# Patient Record
Sex: FEMALE | Race: BLACK OR AFRICAN AMERICAN | NOT HISPANIC OR LATINO | Employment: FULL TIME | URBAN - METROPOLITAN AREA
[De-identification: names, ages, dates, MRNs, and addresses within clinical notes are randomized per-mention and may not be internally consistent; named-entity substitution may affect disease eponyms.]

---

## 2017-01-31 ENCOUNTER — HOSPITAL ENCOUNTER (OUTPATIENT)
Dept: LAB | Facility: MEDICAL CENTER | Age: 32
End: 2017-01-31
Attending: OBSTETRICS & GYNECOLOGY
Payer: COMMERCIAL

## 2017-01-31 LAB
B-HCG SERPL-ACNC: 3.3 MIU/ML (ref 0–10)
BASOPHILS # BLD AUTO: 0.3 % (ref 0–1.8)
BASOPHILS # BLD: 0.02 K/UL (ref 0–0.12)
DHEA-S SERPL-MCNC: 70.2 UG/DL (ref 98.8–340)
EOSINOPHIL # BLD AUTO: 0.13 K/UL (ref 0–0.51)
EOSINOPHIL NFR BLD: 2.2 % (ref 0–6.9)
ERYTHROCYTE [DISTWIDTH] IN BLOOD BY AUTOMATED COUNT: 42.2 FL (ref 35.9–50)
GLUCOSE SERPL-MCNC: 89 MG/DL (ref 65–99)
HCT VFR BLD AUTO: 38.3 % (ref 37–47)
HGB BLD-MCNC: 13.2 G/DL (ref 12–16)
IMM GRANULOCYTES # BLD AUTO: 0.01 K/UL (ref 0–0.11)
IMM GRANULOCYTES NFR BLD AUTO: 0.2 % (ref 0–0.9)
LYMPHOCYTES # BLD AUTO: 1.94 K/UL (ref 1–4.8)
LYMPHOCYTES NFR BLD: 32.2 % (ref 22–41)
MCH RBC QN AUTO: 30.5 PG (ref 27–33)
MCHC RBC AUTO-ENTMCNC: 34.5 G/DL (ref 33.6–35)
MCV RBC AUTO: 88.5 FL (ref 81.4–97.8)
MONOCYTES # BLD AUTO: 0.36 K/UL (ref 0–0.85)
MONOCYTES NFR BLD AUTO: 6 % (ref 0–13.4)
NEUTROPHILS # BLD AUTO: 3.57 K/UL (ref 2–7.15)
NEUTROPHILS NFR BLD: 59.1 % (ref 44–72)
NRBC # BLD AUTO: 0 K/UL
NRBC BLD AUTO-RTO: 0 /100 WBC
PLATELET # BLD AUTO: 254 K/UL (ref 164–446)
PMV BLD AUTO: 9.1 FL (ref 9–12.9)
PROLACTIN SERPL-MCNC: 9.42 NG/ML (ref 2.8–26)
RBC # BLD AUTO: 4.33 M/UL (ref 4.2–5.4)
TSH SERPL DL<=0.005 MIU/L-ACNC: 1.23 UIU/ML (ref 0.35–5.5)
WBC # BLD AUTO: 6 K/UL (ref 4.8–10.8)

## 2017-01-31 PROCEDURE — 85025 COMPLETE CBC W/AUTO DIFF WBC: CPT

## 2017-01-31 PROCEDURE — 83525 ASSAY OF INSULIN: CPT

## 2017-01-31 PROCEDURE — 84146 ASSAY OF PROLACTIN: CPT

## 2017-01-31 PROCEDURE — 36415 COLL VENOUS BLD VENIPUNCTURE: CPT

## 2017-01-31 PROCEDURE — 84443 ASSAY THYROID STIM HORMONE: CPT

## 2017-01-31 PROCEDURE — 84702 CHORIONIC GONADOTROPIN TEST: CPT

## 2017-01-31 PROCEDURE — 82947 ASSAY GLUCOSE BLOOD QUANT: CPT

## 2017-01-31 PROCEDURE — 82627 DEHYDROEPIANDROSTERONE: CPT

## 2017-02-02 LAB — INSULIN P FAST SERPL-ACNC: 12 UIU/ML (ref 3–19)

## 2017-08-18 ENCOUNTER — HOSPITAL ENCOUNTER (OUTPATIENT)
Dept: LAB | Facility: MEDICAL CENTER | Age: 32
End: 2017-08-18
Payer: COMMERCIAL

## 2017-08-18 LAB
BDY FAT % MEASURED: 27.6 %
BP DIAS: 70 MMHG
BP SYS: 109 MMHG
CHOLEST SERPL-MCNC: 137 MG/DL (ref 100–199)
DIABETES HTDIA: NO
EVENT NAME HTEVT: NORMAL
FASTING HTFAS: YES
GLUCOSE SERPL-MCNC: 82 MG/DL (ref 65–99)
HDLC SERPL-MCNC: 51 MG/DL
HYPERTENSION HTHYP: NO
LDLC SERPL CALC-MCNC: 74 MG/DL
SCREENING LOC CITY HTCIT: NORMAL
SCREENING LOC STATE HTSTA: NORMAL
SCREENING LOCATION HTLOC: NORMAL
SMOKING HTSMO: NO
SUBSCRIBER ID HTSID: NORMAL
TRIGL SERPL-MCNC: 60 MG/DL (ref 0–149)

## 2017-08-18 PROCEDURE — 36415 COLL VENOUS BLD VENIPUNCTURE: CPT

## 2017-08-18 PROCEDURE — 80061 LIPID PANEL: CPT

## 2017-08-18 PROCEDURE — S5190 WELLNESS ASSESSMENT BY NONPH: HCPCS

## 2017-08-18 PROCEDURE — 82947 ASSAY GLUCOSE BLOOD QUANT: CPT

## 2017-08-31 ENCOUNTER — TELEPHONE (OUTPATIENT)
Dept: MEDICAL GROUP | Age: 32
End: 2017-08-31

## 2017-08-31 NOTE — TELEPHONE ENCOUNTER
NEW PATIENT VISIT PRE-VISIT PLANNING    1.  [employer] Patient is checked in Patient Demographics? NO    2.  Immunizations were updated in Epic using WebIZ?: Epic matches WebIZ       •  Web Iz Recommendations: FLU, HEPATITIS A , HEPATITIS B, TDAP and VARICELLA (Chicken Pox)     3.  Is this appointment scheduled as a Hospital Follow-Up? No    4.  Patient is due for the following Health Maintenance Topics:   Health Maintenance Due   Topic Date Due   • IMM DTaP/Tdap/Td Vaccine (1 - Tdap) 10/28/2003   • PAP SMEAR  10/28/2005   • IMM INFLUENZA (1) 09/01/2017       - Patient has completed Pap and HMR Letter faxed to:  Dr. Sands.    5.  Reviewed/Updated the following with patient:       •   Preferred Pharmacy? YES       •   Preferred Lab? YES       •   Medications? YES. Was Abstract Encounter opened and chart updated? YES       •   Social History? YES. Was Abstract Encounter opened and chart updated? YES       •   Family History? YES. Was Abstract Encounter opened and chart updated? YES    6.  Updated Care Team?       •   DME Company (gait device, O2, CPAP, etc.) NO       •   Other Specialists (eye doctor, derm, GYN, cardiology, endo, etc): YES    7.  Patient was informed to arrive 15 min prior to their scheduled appointment and bring in their medication bottles.

## 2017-09-01 ENCOUNTER — OFFICE VISIT (OUTPATIENT)
Dept: MEDICAL GROUP | Age: 32
End: 2017-09-01
Payer: COMMERCIAL

## 2017-09-01 VITALS
OXYGEN SATURATION: 97 % | TEMPERATURE: 98.4 F | WEIGHT: 149.8 LBS | SYSTOLIC BLOOD PRESSURE: 102 MMHG | BODY MASS INDEX: 24.08 KG/M2 | DIASTOLIC BLOOD PRESSURE: 60 MMHG | HEART RATE: 85 BPM | HEIGHT: 66 IN

## 2017-09-01 DIAGNOSIS — Z12.4 CERVICAL CANCER SCREENING: ICD-10-CM

## 2017-09-01 DIAGNOSIS — Z00.00 PREVENTATIVE HEALTH CARE: ICD-10-CM

## 2017-09-01 DIAGNOSIS — Z23 NEED FOR VACCINATION: ICD-10-CM

## 2017-09-01 DIAGNOSIS — L20.82 FLEXURAL ECZEMA: ICD-10-CM

## 2017-09-01 PROCEDURE — 90471 IMMUNIZATION ADMIN: CPT | Performed by: FAMILY MEDICINE

## 2017-09-01 PROCEDURE — 90715 TDAP VACCINE 7 YRS/> IM: CPT | Performed by: FAMILY MEDICINE

## 2017-09-01 PROCEDURE — 99204 OFFICE O/P NEW MOD 45 MIN: CPT | Mod: 25 | Performed by: FAMILY MEDICINE

## 2017-09-01 RX ORDER — TRIAMCINOLONE ACETONIDE 1 MG/G
OINTMENT TOPICAL
Qty: 1 TUBE | Refills: 1 | Status: SHIPPED | OUTPATIENT
Start: 2017-09-01 | End: 2018-07-18

## 2017-09-01 NOTE — ASSESSMENT & PLAN NOTE
Patient states that her eczema worsens in these winter months and in places where there is low immunity like [place]. She's been trying to use lotion only. Her areas affected are bilateral shins left upper arm.

## 2017-09-01 NOTE — ASSESSMENT & PLAN NOTE
The patient is a 31-year-old  who presents to clinic to establish care. She is a brand-new patient with no past medical history besides eczema.   The patient denied any chest pain, no sob, no hart, no  pnd, no orthopnea, no headache, no changes in vision, no numbness or tingling, no nausea, no diarrhea, no abdominal pain, no fevers, no chills, no bright red blood per rectum, no  difficulty urinating, no burning during micturition, no depressed mood, no other concerns.        No meds     MGM with  CHF at age 80  Mother with breast cancer at age 62, cured    She works as RT   Walks/slow jog 4 miles 3times per wk

## 2017-09-01 NOTE — PROGRESS NOTES
This medical record contains text that has been entered with the assistance of computer voice recognition and dictation software.  Therefore, it may contain unintended errors in text, spelling, punctuation, or grammar    Chief Complaint   Patient presents with   • Other     see reason for visit       Laurie Alan is a 31 y.o. female here evaluation and management of: establish [facility], eczema      HPI:     Preventative health care  The patient is a 31-year-old  who presents to clinic to [facility]. She is a brand-new patient with no past medical history besides eczema.   The patient denied any chest pain, no sob, no hart, no  pnd, no orthopnea, no headache, no changes in vision, no numbness or tingling, no nausea, no diarrhea, no abdominal pain, no fevers, no chills, no bright red blood per rectum, no  difficulty urinating, no burning during micturition, no depressed mood, no other concerns.        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 She's been trying to use lotion only. Her areas affected are bilateral shins left upper arm.    Current medicines (including changes today)  Current Outpatient Prescriptions   Medication Sig Dispense Refill   • triamcinolone acetonide (KENALOG) 0.1 % Ointment AAA bid prn x 14 days 1 Tube 1     No current facility-administered medications for this visit.      She  has no past medical history on file.  She  has no past surgical history on file.  Social History   Substance Use Topics   • Smoking status: Never Smoker   • Smokeless tobacco: Never Used   • Alcohol use No     Social History     Social History Narrative   • No narrative on file     Family History   Problem Relation Age of Onset   • Cancer Mother    • Hypertension Mother   "  • No Known Problems Father    • No Known Problems Sister    • No Known Problems Brother    • Heart Disease Maternal Grandmother    • Hypertension Maternal Grandmother    • Heart Disease Maternal Grandfather    • Hypertension Maternal Grandfather    • No Known Problems Paternal Grandmother    • No Known Problems Sister    • No Known Problems Sister    • No Known Problems Sister    • No Known Problems Sister    • No Known Problems Sister    • No Known Problems Sister    • No Known Problems Sister    • No Known Problems Sister    • No Known Problems Brother    • No Known Problems Brother    • No Known Problems Brother      Family Status   Relation Status   • Mother Alive   • Father Alive   • Sister Alive   • Brother Alive   • Maternal Grandmother Alive   • Maternal Grandfather    • Paternal Grandmother Alive   • Sister Alive   • Sister Alive   • Sister Alive   • Sister Alive   • Sister Alive   • Sister Alive   • Sister Alive   • Sister Alive   • Brother Alive   • Brother Alive   • Brother Alive         ROS    Please see hpi     All other systems reviewed and are negative     Objective:     Blood pressure 102/60, pulse 85, temperature 36.9 °C (98.4 °F), height 1.676 m (5' 5.98\"), weight 67.9 kg (149 lb 12.8 oz), SpO2 97 %. Body mass index is 24.19 kg/m².  Physical Exam:    Constitutional: Alert, no distress.  Skin: Warm, dry, good turgor, no rashes in visible areas.  Eye: Equal, round and reactive, conjunctiva clear, lids normal.  ENMT: Lips without lesions, good dentition, oropharynx clear.  Neck: Trachea midline, no masses, no thyromegaly. No cervical or supraclavicular lymphadenopathy.  Respiratory: Unlabored respiratory effort, lungs clear to auscultation, no wheezes, no ronchi.  Cardiovascular: Normal S1, S2, no murmur, no edema.  Abdomen: Soft, non-tender, no masses, no hepatosplenomegaly.  Psych: Alert and oriented x3, normal affect and mood.          Assessment and Plan:   The following treatment plan " was discussed      1. Need for vaccination  tdap given today    2. Preventative health care  Care has been established  We need baseline labs to establish a clinical profileWe reviewed USPSTF guidelines    This patient is due for pap smear  Requested Medical records to be sent to us  Denies intimate partner viloence        3. Cervical cancer screening    Due for pap    4. Flexural eczema    Counseled on avoiding triggers ie.    1.avoid excessive heat and low humidity  2.treat stress and anxiety  3.  Moisture Trapping--Skin hydration is the cornerstone, -- Lotions, which have a high water and low oil content, can worsen xerosis via evaporation and trigger a flare of the disease. In contrast, thick creams (eg, Eucerin, Cetaphil, Nutraderm), which have a low water content, or ointments (eg, petroleum jelly, Vaseline, Aquaphor), which have zero water content, better protect against xerosis,  4. controlling pruritis with antihistamines   5. if this fails proceed with topical medium potency steroids     - triamcinolone acetonide (KENALOG) 0.1 % Ointment; AAA bid prn x 14 days  Dispense: 1 Tube; Refill: 1        HEALTH MAINTENANCE:    Instructed to Follow up in clinic or ER for worsening symptoms, difficulty breathing, lack of expected recovery, or should new symptoms or problems arise.    Followup: Return in about 3 months (around 12/1/2017) for Reevaluation.       Once again this medical record contains text that has been entered with the assistance of computer voice recognition and dictation software.  Therefore, it may contain unintended errors in text, spelling, punctuation, or grammar

## 2017-09-09 ENCOUNTER — EH NON-PROVIDER (OUTPATIENT)
Dept: OCCUPATIONAL MEDICINE | Facility: CLINIC | Age: 32
End: 2017-09-09

## 2017-09-09 DIAGNOSIS — Z29.89 NEED FOR ISOLATION: ICD-10-CM

## 2017-09-09 PROCEDURE — 94375 RESPIRATORY FLOW VOLUME LOOP: CPT

## 2017-10-04 ENCOUNTER — OFFICE VISIT (OUTPATIENT)
Dept: MEDICAL GROUP | Age: 32
End: 2017-10-04
Payer: COMMERCIAL

## 2017-10-04 VITALS
OXYGEN SATURATION: 99 % | WEIGHT: 152 LBS | HEIGHT: 66 IN | DIASTOLIC BLOOD PRESSURE: 62 MMHG | TEMPERATURE: 97 F | SYSTOLIC BLOOD PRESSURE: 102 MMHG | HEART RATE: 62 BPM | BODY MASS INDEX: 24.43 KG/M2

## 2017-10-04 DIAGNOSIS — Z23 NEEDS FLU SHOT: ICD-10-CM

## 2017-10-04 DIAGNOSIS — H40.1134 PRIMARY OPEN ANGLE GLAUCOMA OF BOTH EYES, INDETERMINATE STAGE: ICD-10-CM

## 2017-10-04 PROCEDURE — 90471 IMMUNIZATION ADMIN: CPT | Performed by: INTERNAL MEDICINE

## 2017-10-04 PROCEDURE — 90686 IIV4 VACC NO PRSV 0.5 ML IM: CPT | Performed by: INTERNAL MEDICINE

## 2017-10-04 PROCEDURE — 99214 OFFICE O/P EST MOD 30 MIN: CPT | Mod: 25 | Performed by: INTERNAL MEDICINE

## 2017-10-04 ASSESSMENT — PATIENT HEALTH QUESTIONNAIRE - PHQ9: CLINICAL INTERPRETATION OF PHQ2 SCORE: 0

## 2017-10-04 ASSESSMENT — PAIN SCALES - GENERAL: PAINLEVEL: NO PAIN

## 2017-10-04 NOTE — PROGRESS NOTES
"Subjective:   Laurie Alan is a 31 y.o. female here today for evaluation and management of:      Primary open angle glaucoma of both eyes, indeterminate stage  Patient is a regular patient of Dr. Metz.   This is new problem to me. Patient reported that she has routine eye exam in September by optometrist and she was told that her pressure on both eyes are very high. The pressure on right eye was 27 and on left eye was 28. She also reported that her peripheral vision on the left side reduced on eye exam. Her optometrist want her to see ophthalmologist for glaucoma. Patient reported family history of glaucoma on her maternal grandmom and uncle.   She denied injury to her eyes. She has myopia and she wears a glasses. She denied blurry vision or pain on her eye or congestion on her eyes or headache currently.  Patient reported that she has borderline high pressure on her eyes for many years and she had regular eye exam in [place].         Current medicines (including changes today)  Current Outpatient Prescriptions   Medication Sig Dispense Refill   • triamcinolone acetonide (KENALOG) 0.1 % Ointment AAA bid prn x 14 days 1 Tube 1     No current facility-administered medications for this visit.      She  has no past medical history on file.    ROS   No chest pain, no shortness of breath, no abdominal pain       Objective:     Blood pressure 102/62, pulse 62, temperature 36.1 °C (97 °F), height 1.676 m (5' 5.98\"), weight 68.9 kg (152 lb), last menstrual period 09/27/2017, SpO2 99 %, not currently breastfeeding. Body mass index is 24.55 kg/m².   Physical Exam:  General: Alert, oriented and no acute distress.  Eye contact is good, speech goal directed, affect calm  HEENT: conjunctiva non-injected, sclera non-icteric.  Oral mucous membranes pink and moist with no lesions.  Pinna normal.  Lungs: Normal respiratory effort, clear to auscultation bilaterally with good excursion.  CV: regular rate and rhythm. No " murmurs.  Abdomen: soft, non distended, nontender, Bowel sound normal.  Ext: no edema, color normal, vascularity normal, temperature normal        Assessment and Plan:   The following treatment plan was discussed     1. Primary open angle glaucoma of both eyes, indeterminate stage  - Asymptomatic currently. We discussed red flag signs of closed angle glaucoma. Patient is advised to go to ER if she has any acute signs of increased more pressure on her eyes or eyes pain.  - Referral to [facility]mologist at [facility].   - REFERRAL TO OPHTHALMOLOGY    2. Needs flu shot  - Influenza vaccine was given today after reviewing risks and benefits as well as side effects of vaccine.  - INFLUENZA VACCINE QUAD INJ >3Y(PF)      Followup: Return if symptoms worsen or fail to improve.      Please note that this dictation was created using voice recognition software. I have made every reasonable attempt to correct obvious errors, but I expect that there may have unintended errors in text, spelling, punctuation, or grammar that I did not discover.

## 2017-10-04 NOTE — ASSESSMENT & PLAN NOTE
Patient is a regular patient of Dr. Metz.   This is new problem to me. Patient reported that she has routine eye exam in September by optometrist and she was told that her pressure on both eyes are very high. The pressure on right eye was 27 and on left eye was 28. She also reported that her peripheral vision on the left side reduced on eye exam. Her optometrist want her to see ophthalmologist for glaucoma. Patient reported family history of glaucoma on her maternal grandmom and uncle.   She denied injury to her eyes. She has myopia and she wears a glasses. She denied blurry vision or pain on her eye or congestion on her eyes or headache currently.  Patient reported that she has borderline high pressure on her eyes for many years and she had regular eye exam in [place].

## 2017-11-14 ENCOUNTER — TELEPHONE (OUTPATIENT)
Dept: MEDICAL GROUP | Age: 32
End: 2017-11-14

## 2017-11-14 NOTE — TELEPHONE ENCOUNTER
ESTABLISHED PATIENT PRE-VISIT PLANNING     Note: Patient will not be contacted if there is no indication to call.     1.  Reviewed notes from the last few office visits within the medical group: Yes    2.  If any orders were placed at last visit or intended to be done for this visit (i.e. 6 mos follow-up), do we have Results/Consult Notes?        •  Labs - Labs were not ordered at last office visit.   Note: If patient appointment is for lab review and patient did not complete labs, check with provider if OK to reschedule patient until labs completed.       •  Imaging - Imaging was not ordered at last office visit.       •  Referrals - Referral ordered, patient has NOT been seen.    3. Is this appointment scheduled as a Hospital Follow-Up? No    4.  Immunizations were updated in Epic using WebIZ?: Epic matches WebIZ       •  Web Iz Recommendations: HEPATITIS A , HEPATITIS B, TD and VARICELLA (Chicken Pox)     5.  Patient is due for the following Health Maintenance Topics:   Health Maintenance Due   Topic Date Due   • PAP SMEAR  10/28/2005           6.  Patient was NOT informed to arrive 15 min prior to their scheduled appointment and bring in their medication bottles.

## 2017-11-15 ENCOUNTER — OFFICE VISIT (OUTPATIENT)
Dept: MEDICAL GROUP | Age: 32
End: 2017-11-15
Payer: COMMERCIAL

## 2017-11-15 VITALS
SYSTOLIC BLOOD PRESSURE: 98 MMHG | DIASTOLIC BLOOD PRESSURE: 72 MMHG | TEMPERATURE: 97.5 F | WEIGHT: 150.6 LBS | HEART RATE: 90 BPM | BODY MASS INDEX: 24.2 KG/M2 | OXYGEN SATURATION: 97 % | HEIGHT: 66 IN | RESPIRATION RATE: 16 BRPM

## 2017-11-15 DIAGNOSIS — H40.053 BILATERAL OCULAR HYPERTENSION: ICD-10-CM

## 2017-11-15 DIAGNOSIS — G43.109 MIGRAINE WITH AURA AND WITHOUT STATUS MIGRAINOSUS, NOT INTRACTABLE: ICD-10-CM

## 2017-11-15 PROCEDURE — 99214 OFFICE O/P EST MOD 30 MIN: CPT | Performed by: FAMILY MEDICINE

## 2017-11-15 RX ORDER — SUMATRIPTAN 25 MG/1
25-100 TABLET, FILM COATED ORAL
Qty: 10 TAB | Refills: 3 | Status: SHIPPED | OUTPATIENT
Start: 2017-11-15 | End: 2018-07-18

## 2017-11-15 RX ORDER — AMITRIPTYLINE HYDROCHLORIDE 25 MG/1
25 TABLET, FILM COATED ORAL NIGHTLY PRN
Qty: 90 TAB | Refills: 0 | Status: SHIPPED | OUTPATIENT
Start: 2017-11-15 | End: 2018-07-18

## 2017-11-15 ASSESSMENT — PAIN SCALES - GENERAL: PAINLEVEL: NO PAIN

## 2017-11-15 NOTE — ASSESSMENT & PLAN NOTE
The patient is a 32-year-old  female who presents to clinic with chief complaint of migraines. She states these migraines started about 6 weeks ago. That is the same time that she was diagnosed with elevated ocular pressure. She is being evaluated by ophthalmology. She states that the migraines now occur every 2 days, localized behind the eyes as well as the occipital region. She states that she does have sensitivity to loud noises as well as light, and she usually gets a visual warning that the headache is about to occur. She denies any double vision, no numbness or tingling anywhere, no neck stiffness, no early morning nausea or vomiting

## 2017-11-15 NOTE — PROGRESS NOTES
This medical record contains text that has been entered with the assistance of computer voice recognition and dictation software.  Therefore, it may contain unintended errors in text, spelling, punctuation, or grammar    Chief Complaint   Patient presents with   • Migraine     frequent headaches & migraines x 1 month       Lauriesadia Alan is a 32 y.o. female here evaluation and management of: migraines      HPI:       Migraine with aura and without status migrainosus, not intractable  The patient is a 32-year-old  female who presents to clinic with chief complaint of migraines. She states these migraines started about 6 weeks ago. That is the same time that she was diagnosed with elevated ocular pressure. She is being evaluated by ophthalmology. She states that the migraines now occur every 2 days, localized behind the eyes as well as the occipital region. She states that she does have sensitivity to loud noises as well as light, and she usually gets a visual warning that the headache is about to occur. She denies any double vision, no numbness or tingling anywhere, no neck stiffness, no early morning nausea or vomiting      Current medicines (including changes today)  Current Outpatient Prescriptions   Medication Sig Dispense Refill   • SUMAtriptan (IMITREX) 25 MG Tab tablet Take 1-4 Tabs by mouth Once PRN for Migraine for up to 1 dose. 10 Tab 3   • amitriptyline (ELAVIL) 25 MG Tab Take 1 Tab by mouth at bedtime as needed. 90 Tab 0   • triamcinolone acetonide (KENALOG) 0.1 % Ointment AAA bid prn x 14 days 1 Tube 1     No current facility-administered medications for this visit.      She  has no past medical history on file.  She  has no past surgical history on file.  Social History   Substance Use Topics   • Smoking status: Never Smoker   • Smokeless tobacco: Never Used   • Alcohol use No     Social History     Social History Narrative   • No narrative on file     Family History   Problem Relation  "Age of Onset   • Cancer Mother    • Hypertension Mother    • No Known Problems Father    • No Known Problems Sister    • No Known Problems Brother    • Heart Disease Maternal Grandmother    • Hypertension Maternal Grandmother    • Other Maternal Grandmother      glaucoma   • Heart Disease Maternal Grandfather    • Hypertension Maternal Grandfather    • No Known Problems Paternal Grandmother    • No Known Problems Sister    • No Known Problems Sister    • No Known Problems Sister    • No Known Problems Sister    • No Known Problems Sister    • No Known Problems Sister    • No Known Problems Sister    • No Known Problems Sister    • No Known Problems Brother    • No Known Problems Brother    • No Known Problems Brother    • Other Maternal Uncle      glaucoma     Family Status   Relation Status   • Mother Alive   • Father Alive   • Sister Alive   • Brother Alive   • Maternal Grandmother Alive   • Maternal Grandfather    • Paternal Grandmother Alive   • Sister Alive   • Sister Alive   • Sister Alive   • Sister Alive   • Sister Alive   • Sister Alive   • Sister Alive   • Sister Alive   • Brother Alive   • Brother Alive   • Brother Alive   • Maternal Uncle          ROS    Please see hpi     All other systems reviewed and are negative     Objective:     Blood pressure (!) 98/72, pulse 90, temperature 36.4 °C (97.5 °F), resp. rate 16, height 1.676 m (5' 5.98\"), weight 68.3 kg (150 lb 9.6 oz), last menstrual period 2017, SpO2 97 %, not currently breastfeeding. Body mass index is 24.32 kg/m².  Physical Exam:    Constitutional: Alert, no distress.  Skin: Warm, dry, good turgor, no rashes in visible areas.  Eye: Equal, round and reactive, conjunctiva clear, lids normal.  ENMT: Lips without lesions, good dentition, oropharynx clear.  Neck: Trachea midline, no masses, no thyromegaly. No cervical or supraclavicular lymphadenopathy.  Respiratory: Unlabored respiratory effort, lungs clear to auscultation, no wheezes, " no ronchi.  Cardiovascular: Normal S1, S2, no murmur, no edema.  Abdomen: Soft, non-tender, no masses, no hepatosplenomegaly.  Psych: Alert and oriented x3, normal affect and mood.      Neuro:  CN II-XII checked and intact, DTRs 2+ throughout, intact sensation to light touch, vibration, normal gait, No focal deficits, Romberg Normal, Normal tandem gait, normal heel to shin, normal finger to nose.  Negative frontal release signs. NegativeDix-Maciel [place]                      Assessment and Plan:   The following treatment plan was discussed      1. Bilateral ocular hypertension  Maintain visits with ophthamology    2. Migraine with aura and without status migrainosus, not intractable    We will use TCA as prophylaxis  And imitrex as abortive  No focal deficits  No clinical indication for imaging    - SUMAtriptan (IMITREX) 25 MG Tab tablet; Take 1-4 Tabs by mouth Once PRN for Migraine for up to 1 dose.  Dispense: 10 Tab; Refill: 3  - amitriptyline (ELAVIL) 25 MG Tab; Take 1 Tab by mouth at bedtime as needed.  Dispense: 90 Tab; Refill: 0        HEALTH MAINTENANCE:    Instructed to Follow up in clinic or ER for worsening symptoms, difficulty breathing, lack of expected recovery, or should new symptoms or problems arise.    Followup: Return in about 4 weeks (around 12/13/2017) for Reevaluation.       Once again this medical record contains text that has been entered with the assistance of computer voice recognition and dictation software.  Therefore, it may contain unintended errors in text, spelling, punctuation, or grammar

## 2017-11-24 ENCOUNTER — OFFICE VISIT (OUTPATIENT)
Dept: URGENT CARE | Facility: CLINIC | Age: 32
End: 2017-11-24
Payer: COMMERCIAL

## 2017-11-24 VITALS
BODY MASS INDEX: 24.43 KG/M2 | RESPIRATION RATE: 20 BRPM | SYSTOLIC BLOOD PRESSURE: 110 MMHG | WEIGHT: 152 LBS | HEIGHT: 66 IN | HEART RATE: 68 BPM | DIASTOLIC BLOOD PRESSURE: 70 MMHG | TEMPERATURE: 97.8 F | OXYGEN SATURATION: 98 %

## 2017-11-24 DIAGNOSIS — R30.0 DYSURIA: ICD-10-CM

## 2017-11-24 LAB
APPEARANCE UR: NORMAL
BILIRUB UR STRIP-MCNC: NORMAL MG/DL
COLOR UR AUTO: NORMAL
GLUCOSE UR STRIP.AUTO-MCNC: NORMAL MG/DL
KETONES UR STRIP.AUTO-MCNC: NORMAL MG/DL
LEUKOCYTE ESTERASE UR QL STRIP.AUTO: NORMAL
NITRITE UR QL STRIP.AUTO: NORMAL
PH UR STRIP.AUTO: 6 [PH] (ref 5–8)
PROT UR QL STRIP: 30 MG/DL
RBC UR QL AUTO: NORMAL
SP GR UR STRIP.AUTO: 1.02
UROBILINOGEN UR STRIP-MCNC: NORMAL MG/DL

## 2017-11-24 PROCEDURE — 81002 URINALYSIS NONAUTO W/O SCOPE: CPT | Performed by: FAMILY MEDICINE

## 2017-11-24 PROCEDURE — 99214 OFFICE O/P EST MOD 30 MIN: CPT | Performed by: FAMILY MEDICINE

## 2017-11-24 RX ORDER — NITROFURANTOIN 25; 75 MG/1; MG/1
100 CAPSULE ORAL EVERY 12 HOURS
Qty: 10 CAP | Refills: 0 | Status: SHIPPED | OUTPATIENT
Start: 2017-11-24 | End: 2017-11-29

## 2017-11-24 ASSESSMENT — ENCOUNTER SYMPTOMS
DIZZINESS: 0
CHILLS: 0
NAUSEA: 0
VOMITING: 0
FEVER: 0
FOCAL WEAKNESS: 0

## 2017-11-24 NOTE — PROGRESS NOTES
"Subjective:      Laurie Alan is a 32 y.o. female who presents with Dysuria    Chief Complaint   Patient presents with   • Dysuria        - This is a very pleasant 32 y.o. female with complaints of urinary freq/burn x 1 day. No NVFC          ALLERGIES:  Patient has no known allergies.     PMH:  No past medical history on file.     MEDS:    Current Outpatient Prescriptions:   •  nitrofurantoin monohydr macro (MACROBID) 100 MG Cap, Take 1 Cap by mouth every 12 hours for 5 days., Disp: 10 Cap, Rfl: 0  •  SUMAtriptan (IMITREX) 25 MG Tab tablet, Take 1-4 Tabs by mouth Once PRN for Migraine for up to 1 dose., Disp: 10 Tab, Rfl: 3  •  amitriptyline (ELAVIL) 25 MG Tab, Take 1 Tab by mouth at bedtime as needed., Disp: 90 Tab, Rfl: 0  •  triamcinolone acetonide (KENALOG) 0.1 % Ointment, AAA bid prn x 14 days, Disp: 1 Tube, Rfl: 1    ** I have documented what I find to be significant in regards to past medical, social, family and surgical history  in my HPI or under PMH/PSH/FH review section, otherwise it is contributory **             HPI    Review of Systems   Constitutional: Negative for chills and fever.   Gastrointestinal: Negative for nausea and vomiting.   Genitourinary: Positive for dysuria and frequency.   Neurological: Negative for dizziness and focal weakness.          Objective:     /70   Pulse 68   Temp 36.6 °C (97.8 °F)   Resp 20   Ht 1.676 m (5' 6\")   Wt 68.9 kg (152 lb)   LMP 11/01/2017   SpO2 98%   BMI 24.53 kg/m²      Physical Exam   Constitutional: She appears well-developed. No distress.   HENT:   Head: Normocephalic and atraumatic.   Eyes: Conjunctivae are normal.   Neck: Neck supple.   Cardiovascular: Regular rhythm.    No murmur heard.  Pulmonary/Chest: Effort normal. No respiratory distress.   Abdominal: Soft. There is no tenderness.   Neurological: She is alert. She exhibits normal muscle tone.   Skin: Skin is warm and dry.   Psychiatric: She has a normal mood and affect. " Judgment normal.   Nursing note and vitals reviewed.              Assessment/Plan:         1. Dysuria  nitrofurantoin monohydr macro (MACROBID) 100 MG Cap             Dx & d/c instructions discussed w/ patient and/or family members. Follow up w/ Prvt Dr or here in 3-4 days if not getting better, sooner if needed,  ER if worse and UC/PCP unavailable.        Possible side effects (i.e. Rash, GI upset/constipation, sedation, elevation of BP or sugars) of any medications given discussed.

## 2018-07-18 ENCOUNTER — OFFICE VISIT (OUTPATIENT)
Dept: MEDICAL GROUP | Age: 33
End: 2018-07-18
Payer: COMMERCIAL

## 2018-07-18 VITALS
TEMPERATURE: 98.4 F | DIASTOLIC BLOOD PRESSURE: 68 MMHG | HEART RATE: 68 BPM | BODY MASS INDEX: 24.46 KG/M2 | WEIGHT: 152.2 LBS | HEIGHT: 66 IN | OXYGEN SATURATION: 100 % | SYSTOLIC BLOOD PRESSURE: 100 MMHG

## 2018-07-18 DIAGNOSIS — G43.109 MIGRAINE WITH AURA AND WITHOUT STATUS MIGRAINOSUS, NOT INTRACTABLE: ICD-10-CM

## 2018-07-18 DIAGNOSIS — L20.82 FLEXURAL ECZEMA: ICD-10-CM

## 2018-07-18 DIAGNOSIS — H40.053 BILATERAL OCULAR HYPERTENSION: ICD-10-CM

## 2018-07-18 PROCEDURE — 99214 OFFICE O/P EST MOD 30 MIN: CPT | Performed by: INTERNAL MEDICINE

## 2018-07-18 RX ORDER — LATANOPROST 50 UG/ML
SOLUTION/ DROPS OPHTHALMIC
COMMUNITY
Start: 2018-06-08

## 2018-07-18 NOTE — ASSESSMENT & PLAN NOTE
Patient has high pressure on both eyes and follow with Dr. Almendarez, ophthalmologist regularly.  She tried timolol eyedrop initially without any improvement.  Her ophthalmologist switched to latanoprost eyedrop.  She needs to have follow-up appointment with ophthalmologist this year to recheck her eye pressure.  She denied pain inside her eyes or denies vision change or headache.  Patient requested to refer her back to same ophthalmologist.

## 2018-07-18 NOTE — LETTER
[place]  Vadim Palacio M.D.  [address] [facility]   [place] [address]  Fax: [phone]   Authorization for Release/Disclosure of   Protected Health Information   Name: LAURIE FREED : 1985 SSN: [id number]   Address: [address] [place] [address] [address]  [place] [address] Phone:    [phone] (home)    I authorize the entity listed below to release/disclose the PHI below to:   [facility]/Vadim Palacio M.D. and Suzanne Linda M.D.   Provider or Entity Name:  [facility]   Address   City, [facility], [facility]   Phone:      Fax:     Reason for request: continuity of care   Information to be released:    [  ] LAST COLONOSCOPY,  including any PATH REPORT and follow-up  [  ] LAST FIT/COLOGUARD RESULT [  ] LAST DEXA  [  ] LAST MAMMOGRAM  [ XX] LAST PAP  [  ] LAST LABS [  ] RETINA EXAM REPORT  [  ] IMMUNIZATION RECORDS  [  ] Release all info      [  ] Check here and initial the line next to each item to release ALL health information INCLUDING  _____ Care and treatment for drug and / or alcohol abuse  _____ HIV testing, infection status, or AIDS  _____ Genetic Testing    DATES OF SERVICE OR TIME PERIOD TO BE DISCLOSED: _____________  I understand and acknowledge that:  * This Authorization may be revoked at any time by you in writing, except if your health information has already been used or disclosed.  * Your health information that will be used or disclosed as a result of you signing this authorization could be re-disclosed by the recipient. If this occurs, your re-disclosed health information may no longer be protected by State or Federal laws.  * You may refuse to sign this Authorization. Your refusal will not affect your ability to obtain treatment.  * This Authorization becomes effective upon signing and will  on (date) __________.      If no date is indicated, this Authorization will  one (1) year from the signature date.    Name: Laurie Lebron  Waqas    Signature:   Date:     7/18/2018       PLEASE FAX REQUESTED RECORDS BACK TO: [phone]

## 2018-07-19 NOTE — ASSESSMENT & PLAN NOTE
Patient stated that her rash is stable without any recurrent.  She stopped using Kenalog cream as she is concerning of side effects from using steroid cream and skin atrophy.  Eczema is usually worsening in the wintertime.

## 2018-07-19 NOTE — PROGRESS NOTES
"Subjective:   Laurie Alan is a 32 y.o. female here today for evaluation and management of:      Bilateral ocular hypertension  Patient has high pressure on both eyes and follow with Dr. Almendarez, ophthalmologist regularly.  She tried timolol eyedrop initially without any improvement.  Her ophthalmologist switched to latanoprost eyedrop.  She needs to have follow-up appointment with ophthalmologist this year to recheck her eye pressure.  She denied pain inside her eyes or denies vision change or headache.  Patient requested to refer her back to same ophthalmologist.    Migraine with aura and without status migrainosus, not intractable  Patient reported that her headache has been improved.  She does not want to take medication.  She stopped taking amitriptyline and Imitrex already.  She denies recurrent migraine.    Flexural eczema  Patient stated that her rash is stable without any recurrent.  She stopped using Kenalog cream as she is concerning of side effects from using steroid cream and skin atrophy.  Eczema is usually worsening in the wintertime.         Current medicines (including changes today)  Current Outpatient Prescriptions   Medication Sig Dispense Refill   • latanoprost (XALATAN) 0.005 % Solution        No current facility-administered medications for this visit.      She  has no past medical history on file.    ROS   No chest pain, no shortness of breath, no abdominal pain       Objective:     Blood pressure 100/68, pulse 68, temperature 36.9 °C (98.4 °F), height 1.676 m (5' 6\"), weight 69 kg (152 lb 3.2 oz), last menstrual period 07/13/2018, SpO2 100 %. Body mass index is 24.57 kg/m².   Physical Exam:  General: Alert, oriented and no acute distress.  Eye contact is good, speech goal directed, affect calm  HEENT: conjunctiva non-injected, sclera non-icteric.  Oral mucous membranes pink and moist with no lesions.  Pinna normal.   Lungs: Normal respiratory effort, clear to auscultation " bilaterally with good excursion.  CV: regular rate and rhythm. No murmurs.   Abdomen: soft, non distended, nontender, Bowel sound normal.  Ext: no edema, color normal, vascularity normal, temperature normal        Assessment and Plan:   The following treatment plan was discussed     1. Bilateral ocular hypertension  - Stable.  Continue latanoprost eyedrop as instructed by ophthalmologist.  - Referred to ophthalmologist for recheck eye pressure and eyes exam.  - latanoprost (XALATAN) 0.005 % Solution;   - REFERRAL TO OPHTHALMOLOGY    2. Migraine with aura and without status migrainosus, not intractable  - Improved.  Patient does not want to take amitriptyline or Imitrex.  She already stopped taking medication for a few months.  - Discontinue amitriptyline and Imitrex from her medication list.  - Advised to increase water intake to keep well-hydrated.  She can try Tylenol for minor headache.    3. Flexural eczema  - Stable.  Patient does not have any active rash.  She stopped using Kenalog cream as she does not want to have side effects from using steroid cream.  Discussed to keep moisturizing with lotion or cream.      Followup: Return if symptoms worsen or fail to improve.      Please note that this dictation was created using voice recognition software. I have made every reasonable attempt to correct obvious errors, but I expect that there may have unintended errors in text, spelling, punctuation, or grammar that I did not discover.

## 2018-07-19 NOTE — ASSESSMENT & PLAN NOTE
Patient reported that her headache has been improved.  She does not want to take medication.  She stopped taking amitriptyline and Imitrex already.  She denies recurrent migraine.

## 2022-12-09 ENCOUNTER — OFFICE (OUTPATIENT)
Dept: URBAN - METROPOLITAN AREA CLINIC 76 | Facility: CLINIC | Age: 37
Setting detail: OPHTHALMOLOGY
End: 2022-12-09
Payer: COMMERCIAL

## 2022-12-09 DIAGNOSIS — H01.004: ICD-10-CM

## 2022-12-09 DIAGNOSIS — H01.001: ICD-10-CM

## 2022-12-09 DIAGNOSIS — H33.321: ICD-10-CM

## 2022-12-09 DIAGNOSIS — H10.45: ICD-10-CM

## 2022-12-09 DIAGNOSIS — H33.322: ICD-10-CM

## 2022-12-09 DIAGNOSIS — H40.053: ICD-10-CM

## 2022-12-09 PROCEDURE — 92083 EXTENDED VISUAL FIELD XM: CPT | Performed by: OPHTHALMOLOGY

## 2022-12-09 PROCEDURE — 92012 INTRM OPH EXAM EST PATIENT: CPT | Performed by: OPHTHALMOLOGY

## 2022-12-09 ASSESSMENT — TONOMETRY
OD_IOP_MMHG: 18
OS_IOP_MMHG: 17

## 2022-12-09 ASSESSMENT — REFRACTION_AUTOREFRACTION
OD_CYLINDER: -0.75
OS_AXIS: 80
OD_AXIS: 77
OD_SPHERE: -1.75
OS_SPHERE: -2.25
OS_CYLINDER: -0.75

## 2022-12-09 ASSESSMENT — SPHEQUIV_DERIVED
OD_SPHEQUIV: -2.125
OD_SPHEQUIV: -2.375
OS_SPHEQUIV: -2.75
OS_SPHEQUIV: -2.625

## 2022-12-09 ASSESSMENT — REFRACTION_CURRENTRX
OS_AXIS: 64
OS_OVR_VA: 20/
OS_CYLINDER: -0.50
OD_OVR_VA: 20/
OD_CYLINDER: -0.75
OS_SPHERE: -2.50
OD_AXIS: 72
OD_SPHERE: -2.00

## 2022-12-09 ASSESSMENT — KERATOMETRY
OD_K1POWER_DIOPTERS: 45.00
OS_K2POWER_DIOPTERS: 45.50
OS_AXISANGLE_DEGREES: 138
OS_K1POWER_DIOPTERS: 45.25
OD_K2POWER_DIOPTERS: 45.25
OD_AXISANGLE_DEGREES: 92

## 2022-12-09 ASSESSMENT — AXIALLENGTH_DERIVED
OD_AL: 23.925
OS_AL: 23.9307
OD_AL: 23.8252
OS_AL: 23.9809

## 2022-12-09 ASSESSMENT — REFRACTION_MANIFEST
OS_CYLINDER: -0.50
OD_SPHERE: -2.00
OD_VA1: 20/20
OS_AXIS: 80
OS_SPHERE: -2.50
OS_VA1: 20/20
OD_CYLINDER: -0.75
OD_AXIS: 80

## 2022-12-09 ASSESSMENT — VISUAL ACUITY
OS_BCVA: 20/20
OD_BCVA: 20/20

## 2022-12-09 ASSESSMENT — PACHYMETRY
OS_CT_CORRECTION: -2
OD_CT_CORRECTION: -2
OS_CT_UM: 573
OD_CT_UM: 573

## 2022-12-09 ASSESSMENT — LID EXAM ASSESSMENTS
OD_BLEPHARITIS: RUL T
OS_BLEPHARITIS: LUL T

## 2022-12-09 ASSESSMENT — CONFRONTATIONAL VISUAL FIELD TEST (CVF)
OD_FINDINGS: FULL
OS_FINDINGS: FULL

## 2023-06-09 ENCOUNTER — OFFICE (OUTPATIENT)
Dept: URBAN - METROPOLITAN AREA CLINIC 76 | Facility: CLINIC | Age: 38
Setting detail: OPHTHALMOLOGY
End: 2023-06-09
Payer: COMMERCIAL

## 2023-06-09 DIAGNOSIS — H40.053: ICD-10-CM

## 2023-06-09 DIAGNOSIS — H10.45: ICD-10-CM

## 2023-06-09 DIAGNOSIS — H33.321: ICD-10-CM

## 2023-06-09 DIAGNOSIS — H01.004: ICD-10-CM

## 2023-06-09 DIAGNOSIS — H01.001: ICD-10-CM

## 2023-06-09 DIAGNOSIS — H33.322: ICD-10-CM

## 2023-06-09 PROCEDURE — 92014 COMPRE OPH EXAM EST PT 1/>: CPT | Performed by: OPHTHALMOLOGY

## 2023-06-09 PROCEDURE — 92133 CPTRZD OPH DX IMG PST SGM ON: CPT | Performed by: OPHTHALMOLOGY

## 2023-06-09 ASSESSMENT — TONOMETRY
OS_IOP_MMHG: 20
OD_IOP_MMHG: 20

## 2023-06-09 ASSESSMENT — AXIALLENGTH_DERIVED
OD_AL: 23.9694
OS_AL: 24.0256
OD_AL: 23.9694
OS_AL: 24.0762
OS_AL: 24.0762
OD_AL: 24.0198

## 2023-06-09 ASSESSMENT — REFRACTION_MANIFEST
OD_VA1: 20/20
OD_CYLINDER: -1.00
OS_CYLINDER: -0.75
OD_CYLINDER: -0.75
OS_SPHERE: -2.25
OD_SPHERE: -1.75
OS_VA1: 20/20
OS_SPHERE: -2.50
OD_SPHERE: -2.00
OS_AXIS: 082
OD_AXIS: 086
OS_AXIS: 80
OD_VA1: 20/20
OS_VA1: 20/20
OD_AXIS: 80
OS_CYLINDER: -0.50

## 2023-06-09 ASSESSMENT — KERATOMETRY
OD_K1POWER_DIOPTERS: 44.75
OS_K1POWER_DIOPTERS: 45.00
OS_K2POWER_DIOPTERS: 45.25
OD_AXISANGLE_DEGREES: 027
OD_K2POWER_DIOPTERS: 45.00
OS_AXISANGLE_DEGREES: 161

## 2023-06-09 ASSESSMENT — REFRACTION_CURRENTRX
OS_SPHERE: -2.50
OS_CYLINDER: -0.50
OS_AXIS: 77
OD_AXIS: 76
OD_CYLINDER: -0.75
OD_SPHERE: -2.00
OS_OVR_VA: 20/
OD_OVR_VA: 20/

## 2023-06-09 ASSESSMENT — REFRACTION_AUTOREFRACTION
OD_SPHERE: -1.75
OD_CYLINDER: -1.00
OS_CYLINDER: -1.00
OS_AXIS: 082
OS_SPHERE: -2.25
OD_AXIS: 086

## 2023-06-09 ASSESSMENT — LID EXAM ASSESSMENTS
OS_BLEPHARITIS: LUL T
OD_BLEPHARITIS: RUL T

## 2023-06-09 ASSESSMENT — SPHEQUIV_DERIVED
OS_SPHEQUIV: -2.75
OD_SPHEQUIV: -2.25
OS_SPHEQUIV: -2.75
OS_SPHEQUIV: -2.625
OD_SPHEQUIV: -2.375
OD_SPHEQUIV: -2.25

## 2023-06-09 ASSESSMENT — PACHYMETRY
OS_CT_CORRECTION: -2
OS_CT_UM: 573
OD_CT_CORRECTION: -2
OD_CT_UM: 573

## 2023-06-09 ASSESSMENT — CONFRONTATIONAL VISUAL FIELD TEST (CVF)
OS_FINDINGS: FULL
OD_FINDINGS: FULL

## 2023-06-09 ASSESSMENT — VISUAL ACUITY
OD_BCVA: 20/20
OS_BCVA: 20/20

## 2024-02-11 ENCOUNTER — OFFICE (OUTPATIENT)
Dept: URBAN - METROPOLITAN AREA CLINIC 92 | Facility: CLINIC | Age: 39
Setting detail: OPHTHALMOLOGY
End: 2024-02-11
Payer: COMMERCIAL

## 2024-02-11 DIAGNOSIS — H52.13: ICD-10-CM

## 2024-02-11 DIAGNOSIS — H01.001: ICD-10-CM

## 2024-02-11 DIAGNOSIS — H10.45: ICD-10-CM

## 2024-02-11 DIAGNOSIS — H33.322: ICD-10-CM

## 2024-02-11 DIAGNOSIS — H01.004: ICD-10-CM

## 2024-02-11 DIAGNOSIS — H40.053: ICD-10-CM

## 2024-02-11 DIAGNOSIS — H33.321: ICD-10-CM

## 2024-02-11 PROCEDURE — 92015 DETERMINE REFRACTIVE STATE: CPT | Performed by: OPHTHALMOLOGY

## 2024-02-11 PROCEDURE — 92012 INTRM OPH EXAM EST PATIENT: CPT | Performed by: OPHTHALMOLOGY

## 2024-02-11 ASSESSMENT — REFRACTION_MANIFEST
OD_AXIS: 086
OS_CYLINDER: -0.75
OS_VA1: 20/20
OS_AXIS: 082
OS_SPHERE: -2.50
OD_SPHERE: -1.75
OD_AXIS: 80
OD_SPHERE: -2.00
OS_AXIS: 080
OS_AXIS: 80
OD_SPHERE: -1.75
OD_CYLINDER: -1.00
OS_VA1: 20/20
OS_CYLINDER: -0.50
OD_AXIS: 090
OD_CYLINDER: -0.75
OD_CYLINDER: -1.00
OS_VA1: 20/20
OD_VA1: 20/20
OS_SPHERE: -2.25
OD_VA1: 20/20
OS_SPHERE: -2.25
OD_VA1: 20/20
OS_CYLINDER: -0.75

## 2024-02-11 ASSESSMENT — REFRACTION_CURRENTRX
OS_CYLINDER: -0.50
OS_SPHERE: -2.50
OS_OVR_VA: 20/
OS_AXIS: 77
OD_SPHERE: -2.00
OD_AXIS: 76
OD_OVR_VA: 20/
OD_CYLINDER: -0.75

## 2024-02-11 ASSESSMENT — SPHEQUIV_DERIVED
OD_SPHEQUIV: -2.25
OD_SPHEQUIV: -2.25
OD_SPHEQUIV: -2.375
OS_SPHEQUIV: -2.75
OS_SPHEQUIV: -2.625
OD_SPHEQUIV: -2.25

## 2024-02-11 ASSESSMENT — REFRACTION_AUTOREFRACTION
OS_SPHERE: -3.00
OS_AXIS: 165
OS_CYLINDER: +0.75
OD_CYLINDER: +1.00
OD_SPHERE: -2.75
OD_AXIS: 173

## 2024-02-11 ASSESSMENT — LID EXAM ASSESSMENTS
OD_BLEPHARITIS: RUL T
OS_BLEPHARITIS: LUL T

## 2025-03-09 ENCOUNTER — OFFICE (OUTPATIENT)
Dept: URBAN - METROPOLITAN AREA CLINIC 92 | Facility: CLINIC | Age: 40
Setting detail: OPHTHALMOLOGY
End: 2025-03-09
Payer: COMMERCIAL

## 2025-03-09 DIAGNOSIS — H40.053: ICD-10-CM

## 2025-03-09 DIAGNOSIS — H10.45: ICD-10-CM

## 2025-03-09 DIAGNOSIS — H01.001: ICD-10-CM

## 2025-03-09 DIAGNOSIS — H33.321: ICD-10-CM

## 2025-03-09 DIAGNOSIS — H33.322: ICD-10-CM

## 2025-03-09 DIAGNOSIS — H01.004: ICD-10-CM

## 2025-03-09 PROCEDURE — 92014 COMPRE OPH EXAM EST PT 1/>: CPT | Performed by: OPHTHALMOLOGY

## 2025-03-09 PROCEDURE — 92133 CPTRZD OPH DX IMG PST SGM ON: CPT | Performed by: OPHTHALMOLOGY

## 2025-03-09 PROCEDURE — 92083 EXTENDED VISUAL FIELD XM: CPT | Performed by: OPHTHALMOLOGY

## 2025-03-09 ASSESSMENT — KERATOMETRY
METHOD_AUTO_MANUAL: AUTO
OD_K1POWER_DIOPTERS: 45.00
OS_K1POWER_DIOPTERS: 45.25
OS_AXISANGLE_DEGREES: 113
OD_K2POWER_DIOPTERS: 45.00
OD_AXISANGLE_DEGREES: 090
OS_K2POWER_DIOPTERS: 45.50

## 2025-03-09 ASSESSMENT — REFRACTION_CURRENTRX
OD_OVR_VA: 20/
OD_SPHERE: -2.00
OS_OVR_VA: 20/
OD_AXIS: 084
OD_CYLINDER: -1.00
OS_OVR_VA: 20/
OS_CYLINDER: -0.50
OD_CYLINDER: -0.75
OS_SPHERE: -2.50
OD_AXIS: 76
OD_OVR_VA: 20/
OS_CYLINDER: -1.00
OS_AXIS: 77
OD_SPHERE: -1.75
OS_SPHERE: -2.25
OS_AXIS: 075

## 2025-03-09 ASSESSMENT — REFRACTION_MANIFEST
OS_CYLINDER: -0.50
OD_VA1: 20/20
OS_SPHERE: -2.25
OS_SPHERE: -2.25
OS_CYLINDER: -0.75
OD_SPHERE: -1.75
OD_AXIS: 086
OD_VA1: 20/20
OD_CYLINDER: -1.00
OS_CYLINDER: -0.75
OD_SPHERE: -1.75
OD_CYLINDER: -1.00
OS_AXIS: 80
OS_SPHERE: -2.50
OS_AXIS: 082
OS_AXIS: 080
OD_CYLINDER: -0.75
OD_AXIS: 80
OS_VA1: 20/20
OS_VA1: 20/20
OD_VA1: 20/20
OD_AXIS: 090
OD_SPHERE: -2.00
OS_VA1: 20/20

## 2025-03-09 ASSESSMENT — TONOMETRY
OD_IOP_MMHG: 16
OS_IOP_MMHG: 16

## 2025-03-09 ASSESSMENT — PACHYMETRY
OS_CT_CORRECTION: -2
OD_CT_CORRECTION: -2
OD_CT_UM: 573
OS_CT_UM: 573

## 2025-03-09 ASSESSMENT — VISUAL ACUITY
OD_BCVA: 20/20
OS_BCVA: 20/20

## 2025-03-09 ASSESSMENT — CONFRONTATIONAL VISUAL FIELD TEST (CVF)
OD_FINDINGS: FULL
OS_FINDINGS: FULL

## 2025-03-09 ASSESSMENT — REFRACTION_AUTOREFRACTION
OS_CYLINDER: +0.75
OS_SPHERE: -3.00
OD_AXIS: 173
OS_AXIS: 165
OD_CYLINDER: +1.00
OD_SPHERE: -2.75

## 2025-03-09 ASSESSMENT — LID EXAM ASSESSMENTS
OD_BLEPHARITIS: RUL T
OS_BLEPHARITIS: LUL T